# Patient Record
Sex: MALE | Race: WHITE | Employment: UNEMPLOYED | URBAN - NONMETROPOLITAN AREA
[De-identification: names, ages, dates, MRNs, and addresses within clinical notes are randomized per-mention and may not be internally consistent; named-entity substitution may affect disease eponyms.]

---

## 2018-06-06 ENCOUNTER — HOSPITAL ENCOUNTER (EMERGENCY)
Age: 53
Discharge: HOME | End: 2018-06-06
Attending: EMERGENCY MEDICINE
Payer: MEDICAID

## 2018-06-06 VITALS
SYSTOLIC BLOOD PRESSURE: 139 MMHG | TEMPERATURE: 98.2 F | OXYGEN SATURATION: 98 % | BODY MASS INDEX: 32.29 KG/M2 | HEIGHT: 69 IN | HEART RATE: 80 BPM | WEIGHT: 218 LBS | RESPIRATION RATE: 16 BRPM | DIASTOLIC BLOOD PRESSURE: 76 MMHG

## 2018-06-06 DIAGNOSIS — I82.4Z2 DEEP VEIN THROMBOSIS (DVT) OF DISTAL VEIN OF LEFT LOWER EXTREMITY, UNSPECIFIED CHRONICITY (HCC): Primary | ICD-10-CM

## 2018-06-06 PROCEDURE — 99284 EMERGENCY DEPT VISIT MOD MDM: CPT

## 2018-06-06 PROCEDURE — 99282 EMERGENCY DEPT VISIT SF MDM: CPT | Performed by: EMERGENCY MEDICINE

## 2018-06-06 RX ORDER — METHIMAZOLE 5 MG/1
5 TABLET ORAL 3 TIMES DAILY
COMMUNITY

## 2018-06-06 RX ORDER — BUDESONIDE AND FORMOTEROL FUMARATE DIHYDRATE 160; 4.5 UG/1; UG/1
2 AEROSOL RESPIRATORY (INHALATION) 2 TIMES DAILY
COMMUNITY

## 2018-06-06 RX ORDER — WARFARIN SODIUM 7.5 MG/1
7.5 TABLET ORAL
COMMUNITY

## 2018-06-06 RX ORDER — LANOLIN ALCOHOL/MO/W.PET/CERES
400 CREAM (GRAM) TOPICAL DAILY
COMMUNITY

## 2018-06-06 RX ORDER — BUSPIRONE HYDROCHLORIDE 5 MG/1
5 TABLET ORAL 3 TIMES DAILY
COMMUNITY

## 2018-06-06 RX ORDER — VENLAFAXINE HYDROCHLORIDE 150 MG/1
150 TABLET, EXTENDED RELEASE ORAL
COMMUNITY

## 2021-11-25 ENCOUNTER — HOSPITAL ENCOUNTER (EMERGENCY)
Facility: HOSPITAL | Age: 56
Discharge: HOME | End: 2021-11-25
Attending: EMERGENCY MEDICINE | Admitting: EMERGENCY MEDICINE

## 2021-11-25 ENCOUNTER — APPOINTMENT (OUTPATIENT)
Dept: ULTRASOUND IMAGING | Facility: HOSPITAL | Age: 56
End: 2021-11-25

## 2021-11-25 VITALS
DIASTOLIC BLOOD PRESSURE: 71 MMHG | HEIGHT: 69 IN | WEIGHT: 200 LBS | SYSTOLIC BLOOD PRESSURE: 116 MMHG | RESPIRATION RATE: 16 BRPM | BODY MASS INDEX: 29.62 KG/M2 | OXYGEN SATURATION: 95 % | TEMPERATURE: 98 F | HEART RATE: 57 BPM

## 2021-11-25 DIAGNOSIS — M79.662 PAIN OF LEFT CALF: Primary | ICD-10-CM

## 2021-11-25 PROCEDURE — 93971 EXTREMITY STUDY: CPT

## 2021-11-25 PROCEDURE — 99283 EMERGENCY DEPT VISIT LOW MDM: CPT

## 2021-11-25 PROCEDURE — 93971 EXTREMITY STUDY: CPT | Performed by: SURGERY

## 2021-11-25 RX ORDER — HYDROCODONE BITARTRATE AND ACETAMINOPHEN 7.5; 325 MG/1; MG/1
1 TABLET ORAL ONCE
Status: COMPLETED | OUTPATIENT
Start: 2021-11-25 | End: 2021-11-25

## 2021-11-25 RX ADMIN — HYDROCODONE BITARTRATE AND ACETAMINOPHEN 1 TABLET: 7.5; 325 TABLET ORAL at 13:46

## 2021-11-25 NOTE — ED PROVIDER NOTES
Subjective   Patient is a 56-year-old male who presents to the ER with left calf pain and swelling.  Patient states he has had left calf pain and swelling since awakening this morning.  He was diagnosed with a DVT 3 years ago and was treated with blood thinners.  Patient states he is unsure if he is still taking the blood thinner.  Patient states his wife is in charge of his medications.  He denies any recent injury to his leg.  He denies any fever, chest pain, shortness of air, abdominal pain, nausea vomiting diarrhea, urinary changes, neurologic changes.  Patient has no other concerns.          Review of Systems   Constitutional: Negative.    HENT: Negative.    Eyes: Negative.    Respiratory: Negative.    Cardiovascular: Positive for leg swelling.   Gastrointestinal: Negative.    Endocrine: Negative.    Genitourinary: Negative.    Musculoskeletal: Positive for myalgias.   Skin: Negative.    Allergic/Immunologic: Negative.    Neurological: Negative.    Hematological: Negative.    Psychiatric/Behavioral: Negative.    All other systems reviewed and are negative.      Past Medical History:   Diagnosis Date   • COPD (chronic obstructive pulmonary disease) ([facility])    • DVT (deep venous thrombosis) ([facility])    • Graves disease    • Hypertension        Allergies   Allergen Reactions   • Ibuprofen Hives       History reviewed. No pertinent surgical history.    History reviewed. No pertinent family history.    Social History     Socioeconomic History   • Marital status:    Tobacco Use   • Smoking status: Current Every Day Smoker     Packs/day: 0.50   • Smokeless tobacco: Current User   Vaping Use   • Vaping Use: Never used   Substance and Sexual Activity   • Alcohol use: Yes   • Drug use: Yes     Types: Marijuana   • Sexual activity: Defer           Objective   Physical Exam  Vitals and nursing note reviewed.   Constitutional:       Appearance: He is well-developed.   HENT:      Head: Normocephalic and atraumatic.   Eyes:       Conjunctiva/sclera: Conjunctivae normal.      Pupils: Pupils are equal, round, and reactive to light.   Cardiovascular:      Rate and Rhythm: Normal rate and regular rhythm.      Heart sounds: Normal heart sounds.   Pulmonary:      Effort: Pulmonary effort is normal.      Breath sounds: Normal breath sounds.   Abdominal:      Palpations: Abdomen is soft.      Tenderness: There is no abdominal tenderness.   Musculoskeletal:         General: No deformity. Normal range of motion.      Cervical back: Normal range of motion.      Comments: Left calf tender to palpation along with trace edema, nontender to palpation elsewhere including shin, normal range of motion, neurovascular intact, pulses 2+ throughout, good cap refill   Skin:     General: Skin is warm.      Comments: See musculoskeletal exam   Neurological:      Mental Status: He is alert and oriented to person, place, and time.      Sensory: Sensation is intact.      Motor: Motor function is intact.   Psychiatric:         Behavior: Behavior normal.         Procedures           ED Course      Patient was given Lortab. Pain improving.      Wife states patient has been off his blood thinner for the last month.    US Venous Doppler Lower Extremity Left (duplex)    (Results Pending)     Ultrasound was negative for DVT.  Patient most likely has a muscle strain.  Patient will be discharged home to follow-up with his PCP.  He may need repeat ultrasound in 1 week if symptoms persist.  He is to return to the ER immediately for any worsening or new pain, fever, swelling, chest pain, shortness of air or other concerns.  Patient agreeable.                                     MDM    Final diagnoses:   Pain of left calf       ED Disposition  ED Disposition     ED Disposition Condition Comment    Discharge Mari Carney, ISABELA  [address] [place] [id number]  [phone]    Schedule an appointment as soon as possible for a visit             Medication List      No changes were made to your prescriptions during this visit.          Rama Faust MD  11/25/21 7527

## 2021-11-29 NOTE — ED NOTES
Hi is patients name available?     Yes- Hey! My name is ______ with the patient relations/ER department at [facility]. I understand you were in the emergency room yesterday and Dr. Physicians name really cares about his/her patients and requested for me to follow up with you.  There are a few questions our physicians and nurses want to know about your experience.     • Do you mind if I ask them? Yes [x] No []  Reasoning:       • Did your nurse introduce himself/herself when they first met you? Yes [x] No []      • Did you physician or PA/APRN introduce himself/herself? Yes [x] No []      • Did our staff treat you with compassion? Yes [x] No []      • If we did any labs or imaging, did we talk about how long it would take to get results? Yes [] No []      • Were we respectful to you and anyone who was with you? Yes [x] No []      • Did we express care/concern about what brought you into the department? Yes [x] No []      • Is there anyone I can recognize for going above and beyond in their care for you? Yes [x] No []        [if conversation has been generally positive] --- Thank you for taking the time to take this call. We really do appreciate you. You may get a patient survey in the mail. Our leaders review every single one of them, so if you get one, we hope you'll fill it out for us! We hope you have a wonderful rest of your day.     [if conversation has been negative or patient wants to file formal grievance] I am sorry to hear things didn't go so well for you. We appreciate hearing all feedback. From what I've heard today, I'd like to do some additional follow up with our ER leaders. We don't want other patients to have an experience like yours. Do you mind if I share this with our leadership team?      [if no] Okay. If you change your mind, please call me back. Again, I'm sorry your experience wasn't good. I hope you have a good rest of your day.      [if yes] Thank you for that. I'll follow  back up with you after I've had time to review with the right people. We hope you have a good rest of your day.        Cristin Dee  11/29/21 0965